# Patient Record
Sex: MALE | Race: WHITE | NOT HISPANIC OR LATINO | URBAN - METROPOLITAN AREA
[De-identification: names, ages, dates, MRNs, and addresses within clinical notes are randomized per-mention and may not be internally consistent; named-entity substitution may affect disease eponyms.]

---

## 2019-02-11 ENCOUNTER — EMERGENCY (EMERGENCY)
Facility: HOSPITAL | Age: 29
LOS: 1 days | Discharge: HOME | End: 2019-02-11
Attending: EMERGENCY MEDICINE | Admitting: EMERGENCY MEDICINE
Payer: COMMERCIAL

## 2019-02-11 VITALS
DIASTOLIC BLOOD PRESSURE: 90 MMHG | OXYGEN SATURATION: 98 % | HEART RATE: 68 BPM | WEIGHT: 145.06 LBS | SYSTOLIC BLOOD PRESSURE: 129 MMHG | RESPIRATION RATE: 16 BRPM | HEIGHT: 68 IN | TEMPERATURE: 97 F

## 2019-02-11 VITALS
OXYGEN SATURATION: 97 % | TEMPERATURE: 98 F | HEART RATE: 72 BPM | RESPIRATION RATE: 18 BRPM | DIASTOLIC BLOOD PRESSURE: 78 MMHG | SYSTOLIC BLOOD PRESSURE: 122 MMHG

## 2019-02-11 DIAGNOSIS — R20.0 ANESTHESIA OF SKIN: ICD-10-CM

## 2019-02-11 DIAGNOSIS — R51 HEADACHE: ICD-10-CM

## 2019-02-11 DIAGNOSIS — Z88.1 ALLERGY STATUS TO OTHER ANTIBIOTIC AGENTS STATUS: ICD-10-CM

## 2019-02-11 DIAGNOSIS — Z88.0 ALLERGY STATUS TO PENICILLIN: ICD-10-CM

## 2019-02-11 LAB
ANION GAP SERPL CALC-SCNC: 11 MMOL/L — SIGNIFICANT CHANGE UP (ref 5–17)
BASOPHILS # BLD AUTO: 0.04 K/UL — SIGNIFICANT CHANGE UP (ref 0–0.2)
BASOPHILS NFR BLD AUTO: 0.4 % — SIGNIFICANT CHANGE UP (ref 0–2)
BUN SERPL-MCNC: 25 MG/DL — HIGH (ref 7–23)
CALCIUM SERPL-MCNC: 10.2 MG/DL — SIGNIFICANT CHANGE UP (ref 8.4–10.5)
CHLORIDE SERPL-SCNC: 100 MMOL/L — SIGNIFICANT CHANGE UP (ref 96–108)
CO2 SERPL-SCNC: 30 MMOL/L — SIGNIFICANT CHANGE UP (ref 22–31)
CREAT SERPL-MCNC: 0.91 MG/DL — SIGNIFICANT CHANGE UP (ref 0.5–1.3)
EOSINOPHIL # BLD AUTO: 0.18 K/UL — SIGNIFICANT CHANGE UP (ref 0–0.5)
EOSINOPHIL NFR BLD AUTO: 2 % — SIGNIFICANT CHANGE UP (ref 0–6)
GLUCOSE SERPL-MCNC: 99 MG/DL — SIGNIFICANT CHANGE UP (ref 70–99)
HCT VFR BLD CALC: 43.1 % — SIGNIFICANT CHANGE UP (ref 39–50)
HGB BLD-MCNC: 13.9 G/DL — SIGNIFICANT CHANGE UP (ref 13–17)
IMM GRANULOCYTES NFR BLD AUTO: 0.1 % — SIGNIFICANT CHANGE UP (ref 0–1.5)
LYMPHOCYTES # BLD AUTO: 2.71 K/UL — SIGNIFICANT CHANGE UP (ref 1–3.3)
LYMPHOCYTES # BLD AUTO: 29.9 % — SIGNIFICANT CHANGE UP (ref 13–44)
MCHC RBC-ENTMCNC: 28.3 PG — SIGNIFICANT CHANGE UP (ref 27–34)
MCHC RBC-ENTMCNC: 32.3 GM/DL — SIGNIFICANT CHANGE UP (ref 32–36)
MCV RBC AUTO: 87.8 FL — SIGNIFICANT CHANGE UP (ref 80–100)
MONOCYTES # BLD AUTO: 1.05 K/UL — HIGH (ref 0–0.9)
MONOCYTES NFR BLD AUTO: 11.6 % — SIGNIFICANT CHANGE UP (ref 2–14)
NEUTROPHILS # BLD AUTO: 5.07 K/UL — SIGNIFICANT CHANGE UP (ref 1.8–7.4)
NEUTROPHILS NFR BLD AUTO: 56 % — SIGNIFICANT CHANGE UP (ref 43–77)
NRBC # BLD: 0 /100 WBCS — SIGNIFICANT CHANGE UP (ref 0–0)
PLATELET # BLD AUTO: 257 K/UL — SIGNIFICANT CHANGE UP (ref 150–400)
POTASSIUM SERPL-MCNC: 4.6 MMOL/L — SIGNIFICANT CHANGE UP (ref 3.5–5.3)
POTASSIUM SERPL-SCNC: 4.6 MMOL/L — SIGNIFICANT CHANGE UP (ref 3.5–5.3)
RBC # BLD: 4.91 M/UL — SIGNIFICANT CHANGE UP (ref 4.2–5.8)
RBC # FLD: 12 % — SIGNIFICANT CHANGE UP (ref 10.3–14.5)
SODIUM SERPL-SCNC: 141 MMOL/L — SIGNIFICANT CHANGE UP (ref 135–145)
WBC # BLD: 9.06 K/UL — SIGNIFICANT CHANGE UP (ref 3.8–10.5)
WBC # FLD AUTO: 9.06 K/UL — SIGNIFICANT CHANGE UP (ref 3.8–10.5)

## 2019-02-11 PROCEDURE — 85025 COMPLETE CBC W/AUTO DIFF WBC: CPT

## 2019-02-11 PROCEDURE — 99284 EMERGENCY DEPT VISIT MOD MDM: CPT

## 2019-02-11 PROCEDURE — 99284 EMERGENCY DEPT VISIT MOD MDM: CPT | Mod: 25

## 2019-02-11 PROCEDURE — 70450 CT HEAD/BRAIN W/O DYE: CPT | Mod: 26

## 2019-02-11 PROCEDURE — 82962 GLUCOSE BLOOD TEST: CPT

## 2019-02-11 PROCEDURE — 36415 COLL VENOUS BLD VENIPUNCTURE: CPT

## 2019-02-11 PROCEDURE — 70450 CT HEAD/BRAIN W/O DYE: CPT

## 2019-02-11 PROCEDURE — 80048 BASIC METABOLIC PNL TOTAL CA: CPT

## 2019-02-11 NOTE — ED PROVIDER NOTE - PHYSICAL EXAMINATION
CONSTITUTIONAL: Well appearing, well nourished, awake, alert and in no apparent distress.  HEENT: Head is atraumatic. Eyes clear bilaterally, normal EOMI. Airway patent.  CARDIAC: Normal rate, regular rhythm.  Heart sounds S1, S2.   RESPIRATORY: Breath sounds clear and equal bilaterally. no tachypnea, respiratory distress.   GASTROINTESTINAL: Abdomen soft, non-tender, no guarding, distension.  MUSCULOSKELETAL: Spine appears normal, no midline spinal tenderness, range of motion is not limited, no muscle or joint tenderness. no bony tenderness. no JVD, peripheral edema.   NEUROLOGICAL: Alert and oriented, no focal deficits, no motor or sensory deficits.  SKIN: Skin normal color for race, warm, dry and intact. No evidence of rash.  PSYCHIATRIC: Alert and oriented to person, place, time/situation. normal mood and affect. no apparent risk to self or others.    Patient is alert, oriented x person, place and time.  Cranial nerves 2-12 are intact.  Normal gait and speech.  Cerebellar testing normal:  negative Romberg, normal coordination and normal finger to nose, heal to shin and rapid alternating movements.  Normal proprioception and sensory exam.  No pronator drift.  5/5 bl upper extremity and lower extremity strength.
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
## 2019-02-11 NOTE — ED PROVIDER NOTE - CARE PROVIDER_API CALL
Yamil Arias)  Neurology; Neuromuscular Medicine  [address], [address] [place] [address]  Phone: [phone]  Fax: [phone]  Follow Up Time:

## 2019-02-11 NOTE — ED ADULT NURSE NOTE - OBJECTIVE STATEMENT
Received a 28 year old male with a chief complaint of a "pressure" sensation to his head radiating to his left upper extremity and left lower extremity. Patient denies any past medical history. Patient is observed to be able to ambulate independently without assistance.
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
## 2019-02-11 NOTE — ED PROVIDER NOTE - NSFOLLOWUPINSTRUCTIONS_ED_ALL_ED_FT
Weakness    WHAT YOU NEED TO KNOW:    Weakness is a loss of muscle strength. It may be caused by brain, nerve, or muscle problems. Physical and mental conditions such as heart problems, pregnancy, dehydration, or depression may also cause weakness. Reactions to certain drugs can cause weakness. Parts of your body may become weak if you need to wear a cast or splint or have been on bed rest for a long time.    DISCHARGE INSTRUCTIONS:    Call 911 for any of the following:     You have any of the following signs of a stroke:   Numbness or drooping on one side of your face       Weakness in an arm or leg      Confusion or difficulty speaking      Dizziness, a severe headache, or vision loss      You lose feeling in your weakened body area.      You have electric shock-like feelings down your arms and legs when you flex or move your neck.      You have sudden or increased trouble speaking, swallowing, or breathing.    Seek care immediately if:     You have severe pain in your back, arms, or legs that worsens.      You have sudden or worsened muscle weakness or loss of movement.      You are not able to control when you urinate or have a bowel movement.    Contact your healthcare provider if:     You feel depressed or anxious.       You have questions or concerns about your condition or care.     Manage weakness:     Use assistive devices as directed. These help protect you from injury. Examples include a walker or cane. Have someone install handrails in your home. These will help you get out of a bathtub or stand up from a toilet. Use a shower chair so you can sit while you shower. Sit down on the toilet or another chair to dry off and put on your clothes. Get help going up and down stairs if your legs are weak.       Go to physical or occupational therapy if directed. A physical therapist can teach you exercises to help strengthen weak muscles. An occupational therapist can show you ways to do your daily activities more easily. For example, light forks and spoons can be easier to use if you have hand weakness. You may also learn ways to organize your household items so you are not moving heavy items.      Balance rest with exercise. Exercise can help increase your muscle strength and energy. Do not exercise for long periods at a time. Take breaks often to rest. Too much exercise can cause muscle strain or make you more tired. Ask your healthcare provider how much exercise is right for you.      Eat a variety of healthy foods. Too much or too little food may cause weakness or tiredness. Ask your healthcare provider what a healthy amount of food is for you. Healthy foods include fruits, vegetables, whole-grain breads, low-fat dairy products, lean meats and fish, nuts, and cooked beans.      Do not smoke. Nicotine and other chemicals in cigarettes and cigars can make your symptoms worse, and can cause lung damage. Ask your healthcare provider for information if you currently smoke and need help to quit. E-cigarettes or smokeless tobacco still contain nicotine. Talk to your healthcare provider before you use these products.       Do not use caffeine, alcohol, or illegal drugs. These may cause muscle twitching, which could lead to worsened weakness.     Follow up with your healthcare provider as directed: Write down your questions so you remember to ask them during your visits.       © Copyright [employer] 2019 All illustrations and images included in CareNotes are the copyrighted property of [employer] or [employer]      back to top                      © Copyright [employer] 2019
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
## 2019-02-11 NOTE — ED PROVIDER NOTE - NSFOLLOWUPCLINICS_GEN_ALL_ED_FT
[facility]  Family Medicine  [facility]  [place], [place] formerly [facility]  Phone: [phone]  Fax:   Follow Up Time:

## 2019-02-11 NOTE — ED ADULT NURSE NOTE - NSIMPLEMENTINTERV_GEN_ALL_ED
Implemented All Universal Safety Interventions:  [place] to call system. Call bell, personal items and telephone within reach. Instruct patient to call for assistance. Room bathroom lighting operational. Non-slip footwear when patient is off stretcher. Physically safe environment: no spills, clutter or unnecessary equipment. Stretcher in lowest position, wheels locked, appropriate side rails in place.

## 2019-02-11 NOTE — ED ADULT NURSE NOTE - CHPI ED NUR SYMPTOMS NEG
no weakness/no confusion/no change in level of consciousness/no nausea/no loss of consciousness/no blurred vision/no dizziness/no numbness/no fever/no vomiting

## 2019-02-11 NOTE — ED PROVIDER NOTE - CARE PROVIDERS DIRECT ADDRESSES
[email or web address]

## 2019-02-14 ENCOUNTER — INBOUND DOCUMENT (OUTPATIENT)
Age: 29
End: 2019-02-14

## 2019-02-14 PROBLEM — Z00.00 ENCOUNTER FOR PREVENTIVE HEALTH EXAMINATION: Status: ACTIVE | Noted: 2019-02-14

## 2019-07-05 ENCOUNTER — APPOINTMENT (OUTPATIENT)
Dept: OPHTHALMOLOGY | Facility: CLINIC | Age: 29
End: 2019-07-05
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
## 2024-01-09 NOTE — ED ADULT TRIAGE NOTE - WEIGHT IN KG
[facility]    INFECTIOUS DISEASE PROGRESS NOTE    Viji Vargas  1956  [id number]    Date of consult: 1/5/24    Admit date: 12/24/2023    Requesting Provider: Janak Gutiérrez DO  Evaluating physician: Walter Sanchez MD  Reason for Consultation: Complicated right pleural effusion, parapneumonic effusion status post chest tube and thrombolytics, Acinetobacter in patient with anoxic brain injury  Chief Complaint: Above      Subjective   History of present illness:  Patient is a  67 y.o.  Yr old female with a history of cardiac arrest and anoxic brain injury March 2019 status post trach and PEG, COPD, hypertension, multidrug-resistant organisms recently admitted to [facility] on 12/22 for respiratory distress, then transferred and admitted to [facility] on 12/24/2023 with a bronchoscopy revealing bronchial malacia, and culture positive for Acinetobacter sensitive to ampicillin sulbactam and meropenem and gentamicin, resistant to cefepime.  A 14 Macedonian tube was placed and the existing chest tube removed, with instillation of thrombolytics.  At the patient was initially on piperacillin/tazobactam but changed to meropenem on 12/27.  Pleural fluid culture from 12/25 negative.  Blood cultures 12/24 negative.  Urine culture at [facility] positive for Proteus.  Blood culture from [facility] positive for staphylococcal species contaminant.  COVID-19 and influenza a negative.  Pleural fluid cell studies included WBC 1430, RBC 7000, 87% neutrophils, protein 3.8, glucose 18, with placement of right pigtail chest tube 12/22.  CT scan revealed loculated right pleural effusion with large right lower lobe pneumonia.  The patient also had a right pneumothorax.  She was stabilized and moved out of the ICU to the floor on around 1/4/2024.  I was consulted on 1/5/2024.  Patient is a poor historian.    1/8/2024 history reviewed.  No high  fevers or chills.  Not a good historian.  Tolerating meropenem until 1/27 for complicated pleural effusion and possible early empyema.  Previous culture positive for Acinetobacter sputum.    1/9/2024 history reviewed.  No high fever.  No history from patient.  On antibiotics to 1/27 for complicated pleural effusion and early empyema.    Past Medical History:   Diagnosis Date    COPD (chronic obstructive pulmonary disease)     Hypertension     Pneumonia     Stroke        Past Surgical History:   Procedure Laterality Date    HYSTERECTOMY      Partial     TRACHEOSTOMY AND PEG TUBE INSERTION N/A 3/20/2019    Procedure: TRACHEOSTOMY AND PERCUTANEOUS ENDOSCOPIC GASTROSTOMY TUBE INSERTION;  Surgeon: Nadira Pandey MD;  Location: [facility] OR;  Service: General       Pediatric History   Patient Parents    Not on file     Other Topics Concern    Not on file   Social History Narrative    Not on file   No current cigarettes, alcohol, drug use    family history is not on file.  Unavailable per patient  No Known Allergies    Immunization History   Administered Date(s) Administered    COVID-19 (PFIZER) BIVALENT 12+YRS 10/17/2022    COVID-19 (PFIZER) Purple Cap Monovalent 01/21/2021, 02/11/2021    COVID-19 F23 (MODERNA) 12YRS+ (SPIKEVAX) 12/04/2023    Fluzone High-Dose 65+yrs 01/08/2024    Pneumococcal Conjugate 13-Valent (PCV13) 11/08/2021    Tdap 06/10/2015       Medication:  @Scheduled Meds:baclofen, 10 mg, Per PEG Tube, Q8H  carvedilol, 6.25 mg, Per PEG Tube, Q12H  glycopyrrolate, 2 mg, Per PEG Tube, TID  heparin (porcine), 5,000 Units, Subcutaneous, Q8H  meropenem, 1,000 mg, Intravenous, Q8H  midodrine, 5 mg, Oral, TID AC  ProSource No Carb, 30 mL, Per PEG Tube, Daily  Scopolamine, 1 patch, Transdermal, Q72H  senna-docusate sodium, 2 tablet, Per G Tube, BID  sodium chloride, 10 mL, Intravenous, Q12H      Continuous Infusions:     PRN Meds:.  acetaminophen    senna-docusate sodium **AND** polyethylene glycol **AND**  "[DISCONTINUED] bisacodyl **AND** bisacodyl    Calcium Replacement - Follow Nurse / BPA Driven Protocol    dextrose    glucagon (human recombinant)    Magnesium Standard Dose Replacement - Follow Nurse / BPA Driven Protocol    Phosphorus Replacement - Follow Nurse / BPA Driven Protocol    Potassium Replacement - Follow Nurse / BPA Driven Protocol     Please refer to the medical record for a full medication list    Review of Systems: Difficult to obtain secondary to mental status.      Physical Exam:   Vital Signs   Temp:  [97.5 °F (36.4 °C)-98.8 °F (37.1 °C)] 97.9 °F (36.6 °C)  Heart Rate:  [] 95  Resp:  [16-18] 18  BP: ()/(34-72) 111/67    Blood pressure 111/67, pulse 95, temperature 97.9 °F (36.6 °C), temperature source Axillary, resp. rate 18, height 162.6 cm (64.02\"), weight 70.5 kg (155 lb 6.4 oz), SpO2 100%.  GENERAL: Awake and alert, in no acute distress. Appears older than stated age.  Resting in bed.  HEENT:  Normocephalic, atraumatic.  Oropharynx without thrush. Dentition in good repair. No cervical adenopathy. No neck masses.  Ears externally normal, Nose externally normal.  Trach site okay.  EYES:  No conjunctival injection. No icterus. EOM full.  LYMPHATICS: No lymphadenopathy of the neck or axillary or inguinal regions.   HEART: No murmur, gallop, or pericardial friction rub. Reg rate rhythm, No JVD at 45 degrees.  LUNGS: Crackles right greater than left with decreased breath sounds right base. No respiratory distress, no use of accessory muscles.  ABDOMEN: Soft, nontender, nondistended. No appreciable HSM.  Bowel sounds normal.  SKIN: Warm and dry without cutaneous eruptions.    PSYCHIATRIC: Mental status lethargic.  EXT:  No cellulitic change.  NEURO: Oriented to 0, nonfocal.      Results Review:   I reviewed the patient's new clinical results.  I reviewed the patient's new imaging results and agree with the interpretation.  I reviewed the patient's other test results and agree with the " "interpretation    Results from last 7 days   Lab Units 01/09/24  0716 01/08/24  0438 01/07/24  0504   WBC 10*3/mm3 11.09* 9.64 9.05   HEMOGLOBIN g/dL 9.6* 10.9* 9.8*   HEMATOCRIT % 30.3* 33.9* 31.4*   PLATELETS 10*3/mm3 366 369 468*     Results from last 7 days   Lab Units 01/09/24  0716   SODIUM mmol/L 140   POTASSIUM mmol/L 4.7   CHLORIDE mmol/L 103   CO2 mmol/L 29.0   BUN mg/dL 25*   CREATININE mg/dL 0.43*   GLUCOSE mg/dL 137*   CALCIUM mg/dL 8.7     Results from last 7 days   Lab Units 01/09/24  0716   ALK PHOS U/L 83   BILIRUBIN mg/dL 0.3   ALT (SGPT) U/L 23   AST (SGOT) U/L 20         Results from last 7 days   Lab Units 01/07/24  0504   CRP mg/dL 3.70*             Estimated Creatinine Clearance: 125.6 mL/min (A) (by C-G formula based on SCr of 0.43 mg/dL (L)).     Procalitonin Results:       Brief Urine Lab Results  (Last result in the past 365 days)        Color   Clarity   Blood   Leuk Est   Nitrite   Protein   CREAT   Urine HCG        12/24/23 1755 Yellow   Cloudy   Moderate (2+)   Small (1+)   Negative   30 mg/dL (1+)                  No results found for: \"SITE\", \"ALLENTEST\", \"PHART\", \"VYZ1XHN\", \"PO2ART\", \"GAA8WWS\", \"BASEEXCESS\", \"T9TPNWWL\", \"HGBBG\", \"HCTABG\", \"OXYHEMOGLOBI\", \"METHHGBN\", \"CARBOXYHGB\", \"CO2CT\", \"BAROMETRIC\", \"MODALITY\", \"FIO2\"     Microbiology:  12/24/2023 with a bronchoscopy revealing bronchial malacia, and culture positive for Acinetobacter sensitive to ampicillin sulbactam and meropenem and gentamicin, resistant to cefepime, pleural fluid culture negative    Radiology:  Imaging Results (Last 72 Hours)       ** No results found for the last 72 hours. **            IMPRESSION:     1.  Right parapneumonic complicated pleural effusion, culture negative from 12/25/2023, with bronchoscopy culture positive for Acinetobacter baumannii, treated with meropenem since 12/27.  Pulmonary evaluation suggested that patient may not resolve the loculated effusions and may require surgery but not a good " surgical candidate.  The patient was treated with thrombolytics and chest tube.  Cytology negative.  Seems to be improved on current regimen.  2.  Acinetobacter pneumonia right lower lobe likely related to aspiration from mental status from previous anoxic brain injury 2019.  3.  Anoxic brain injury with encephalopathy chronic and continues.  4.  Anemia, chronic disease.  Worse.  5.  History of COPD, essential hypertension contributing to issues above.  6.  Leukocytosis, neutrophilic, new.    PLAN:    1.  Diagnostically, continue to follow patient's physical exam, CBC, CMP, CRP, culture data, radiographic studies.  Likely repeat CT scan in a month.  2.  Therapeutically, changed to ampicillin sulbactam 3 g IV every 6 hours until 1/27 for complicated right pleural effusion and severe Acinetobacter pneumonia.  Increased risk for therapeutic failure but not a good surgical candidate for decortication.  3.  Supportive care.  Status post previous trach and PEG 2019.    Case management orders: Please arrange for skilled facility IV antibiotics with ampicillin-sulbactam 3 g IV every 6 hours to continue until 1/27/2024.  This is for complicated right pleural effusion and Acinetobacter pneumonia.  Check CBC, CMP, CRP weekly while on IV antibiotics.  Fax orders to [phone], call [phone] with final arrangements.  Arrange for follow-up with me in 2 weeks postdischarge.  Weekly PICC dressing changes.    I discussed the patient's findings and my recommendations with nursing staff    Thank you for asking me to see Viji Vargas.  Our group would be pleased to follow this patient over the course of their hospitalization and assist with outpatient antimicrobial therapy, as indicated.  Further recommendations depend on the results of the cultures and clinical course.  Poor long-term prognosis.      Walter Sanchez MD  1/9/2024    65.8